# Patient Record
Sex: MALE | Race: WHITE | NOT HISPANIC OR LATINO | URBAN - METROPOLITAN AREA
[De-identification: names, ages, dates, MRNs, and addresses within clinical notes are randomized per-mention and may not be internally consistent; named-entity substitution may affect disease eponyms.]

---

## 2022-06-06 ENCOUNTER — EMERGENCY (EMERGENCY)
Facility: HOSPITAL | Age: 37
LOS: 1 days | Discharge: HOME | End: 2022-06-06
Attending: EMERGENCY MEDICINE | Admitting: EMERGENCY MEDICINE
Payer: OTHER MISCELLANEOUS

## 2022-06-06 VITALS
WEIGHT: 279.99 LBS | DIASTOLIC BLOOD PRESSURE: 91 MMHG | HEART RATE: 93 BPM | HEIGHT: 78 IN | TEMPERATURE: 98 F | RESPIRATION RATE: 17 BRPM | SYSTOLIC BLOOD PRESSURE: 147 MMHG
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
## 2022-06-06 DIAGNOSIS — Z88.0 ALLERGY STATUS TO PENICILLIN: ICD-10-CM

## 2022-06-06 DIAGNOSIS — S61.214A LACERATION WITHOUT FOREIGN BODY OF RIGHT RING FINGER WITHOUT DAMAGE TO NAIL, INITIAL ENCOUNTER: ICD-10-CM

## 2022-06-06 DIAGNOSIS — Z23 ENCOUNTER FOR IMMUNIZATION: ICD-10-CM
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
## 2022-06-06 DIAGNOSIS — S62.664A NONDISPLACED FRACTURE OF DISTAL PHALANX OF RIGHT RING FINGER, INITIAL ENCOUNTER FOR CLOSED FRACTURE: ICD-10-CM

## 2022-06-06 DIAGNOSIS — Z91.041 RADIOGRAPHIC DYE ALLERGY STATUS: ICD-10-CM
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
## 2022-06-06 PROCEDURE — 73130 X-RAY EXAM OF HAND: CPT | Mod: 26,RT

## 2022-06-06 PROCEDURE — 99283 EMERGENCY DEPT VISIT LOW MDM: CPT | Mod: 25

## 2022-06-06 PROCEDURE — 12002 RPR S/N/AX/GEN/TRNK2.6-7.5CM: CPT

## 2022-06-06 NOTE — CONSULT NOTE ADULT - SUBJECTIVE AND OBJECTIVE BOX
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  Able to maintain right ring finger DIP joint in extension.  X-ray show minimally displaced tuft fracture of distal phalanx of right ring finger, No foreign bodies.   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  Able to maintain right ring finger DIP joint in extension.  X-ray shows minimally displaced tuft fracture of distal phalanx of right ring finger, No foreign bodies.

## 2022-06-06 NOTE — CONSULT NOTE ADULT - ASSESSMENT
laceration of dorsum of right ring finger at DIP joint level without foreign bodies, and extensor tendon function intact.

## 2022-06-06 NOTE — CONSULT NOTE ADULT - PROBLEM SELECTOR RECOMMENDATION 9
suggest rinse with betadine and wound closure with running 5-0 nylon, finger splint with PIP and DIP joints at   full extension  Tetanus shot  Keflex for a week . Follow up in my office Thursday 10 am

## 2022-06-06 NOTE — ED ADULT TRIAGE NOTE - CHIEF COMPLAINT QUOTE
Pt walked in w/ right hand 4th digit laceration. Pt is a  and accidently cut his finger at work. Wound actively bleeding, pressure dressing applied. Pulses and sensation intact. Pmh of HTN.  Tetanus not up to date.

## 2022-06-07 ENCOUNTER — EMERGENCY (EMERGENCY)
Facility: HOSPITAL | Age: 37
LOS: 1 days | Discharge: HOME | End: 2022-06-07
Admitting: EMERGENCY MEDICINE
Payer: OTHER MISCELLANEOUS

## 2022-06-07 VITALS
OXYGEN SATURATION: 97 % | RESPIRATION RATE: 16 BRPM | SYSTOLIC BLOOD PRESSURE: 147 MMHG | HEART RATE: 91 BPM | DIASTOLIC BLOOD PRESSURE: 95 MMHG | TEMPERATURE: 99 F

## 2022-06-07 VITALS
HEIGHT: 78 IN | OXYGEN SATURATION: 98 % | DIASTOLIC BLOOD PRESSURE: 102 MMHG | RESPIRATION RATE: 16 BRPM | SYSTOLIC BLOOD PRESSURE: 144 MMHG | HEART RATE: 92 BPM | TEMPERATURE: 99 F | WEIGHT: 279.99 LBS

## 2022-06-07 DIAGNOSIS — Z88.0 ALLERGY STATUS TO PENICILLIN: ICD-10-CM

## 2022-06-07 DIAGNOSIS — S61.214D LACERATION WITHOUT FOREIGN BODY OF RIGHT RING FINGER WITHOUT DAMAGE TO NAIL, SUBSEQUENT ENCOUNTER: ICD-10-CM
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
## 2022-06-07 DIAGNOSIS — Z91.041 RADIOGRAPHIC DYE ALLERGY STATUS: ICD-10-CM

## 2022-06-07 PROCEDURE — 12001 RPR S/N/AX/GEN/TRNK 2.5CM/<: CPT

## 2022-06-07 PROCEDURE — 99283 EMERGENCY DEPT VISIT LOW MDM: CPT | Mod: 25

## 2022-06-07 RX ORDER — IBUPROFEN 200 MG
1 TABLET ORAL
Qty: 28 | Refills: 0
Start: 2022-06-07 | End: 2022-06-13

## 2022-06-07 RX ORDER — TETANUS TOXOID, REDUCED DIPHTHERIA TOXOID AND ACELLULAR PERTUSSIS VACCINE, ADSORBED 5; 2.5; 8; 8; 2.5 [IU]/.5ML; [IU]/.5ML; UG/.5ML; UG/.5ML; UG/.5ML
0.5 SUSPENSION INTRAMUSCULAR ONCE
Refills: 0 | Status: COMPLETED | OUTPATIENT
Start: 2022-06-07 | End: 2022-06-07

## 2022-06-07 RX ADMIN — Medication 100 MILLIGRAM(S): at 00:52

## 2022-06-07 RX ADMIN — TETANUS TOXOID, REDUCED DIPHTHERIA TOXOID AND ACELLULAR PERTUSSIS VACCINE, ADSORBED 0.5 MILLILITER(S): 5; 2.5; 8; 8; 2.5 SUSPENSION INTRAMUSCULAR at 02:27

## 2022-06-07 NOTE — ED PROVIDER NOTE - CLINICAL SUMMARY MEDICAL DECISION MAKING FREE TEXT BOX
patient with laceration injury to R 4th digit, with flap, and nail laceration, will require xrays, tetanus required, abx likely will consult hand. otherwise no distracting injuries, no ac, stable vs.

## 2022-06-07 NOTE — ED PROVIDER NOTE - PATIENT PORTAL LINK FT
You can access the FollowMyHealth Patient Portal offered by [facility] by registering at the following website: [email or web address]. By joining [employer]’s FollowMyHealth portal, you will also be able to view your health information using other applications (apps) compatible with our system.

## 2022-06-07 NOTE — ED PROVIDER NOTE - PHYSICAL EXAMINATION
Constitutional:  Well appearing, awake, alert, oriented to person, place, time/situation and in no apparent distress  Head:  NC/AT, symmetric  ENMT: Airway patent  Eyes:  Clear bilaterally, pupils equal, round   Cardiac:  Normal rate  Respiratory:  Normal respiratory rate and effort  GI:   Abd soft, non-distended  MSK:  Atraumatic, FROM  Neuro:  Alert and oriented  Skin:  Skin normal color for race, warm, dry.  R hand: 4th digit laceration over dorsal digit with sutures in place.  there is scant bleeding along radial border of laceration.  brisk cap refill and sensation is intact.  Sutured flap is slightly discolored.  No purulence or crepitus, no edema.  Psych:  Normal mood and affect, no apparent risk to self or others.

## 2022-06-07 NOTE — ED PROVIDER NOTE - CARE PLAN
Principal Discharge DX:	Laceration of finger, right, complicated  Secondary Diagnosis:	Closed fracture of tuft of distal phalanx of finger   1

## 2022-06-07 NOTE — ED PROVIDER NOTE - OBJECTIVE STATEMENT
36 y/o M returns to ED s/p laceration repair of 4th finger on R hand.  Pt states he has been bleeding from the wound since the laceration repair.  He called Dr. Victor during the day who urged to come to the ED for a wound check.

## 2022-06-07 NOTE — ED PROVIDER NOTE - PROGRESS NOTE DETAILS
patient advised follow up with dr chen on thursday. per dr chen recommendation, suture laceration, splint, and place on abx, follow up with his office on thursday. after reviewing films with patient, he notes several weeks ago he slammed same finger at work, and sustained bruising to tip, possible sub acute tuft fracture.

## 2022-06-07 NOTE — ED PROVIDER NOTE - CARE PROVIDER_API CALL
Michael Victor)  Plastic Surgery; Surgery of the Hand  [address] [place] [address], [address], [place] [address]  Phone: [phone]  Fax: [phone]  Follow Up Time:

## 2022-06-07 NOTE — ED PROVIDER NOTE - OBJECTIVE STATEMENT
36 yo M, pmhx of HTN, presents with R ring finger laceration, R hand dominant, sustained while carrying several cardboard boxes, and fell onto a metal grate with boxes, and abraded and contused hand against metal grate. notes no numbness or paresthesias. denies numbness, paresthesias. endorses some etoh, works as a . denies loc, denies head or neck injury. denies injury to rest of the RUE. unsure of tetanus at this time. denies focal weakness to RUE. ambulatory to ED.

## 2022-06-07 NOTE — ED PROVIDER NOTE - NSFOLLOWUPINSTRUCTIONS_ED_ALL_ED_FT
FOLLOW UP WITH DR. MORA, HAND SURGERY AS SCHEDULED TOMORROW.    KEEP WOUND CLEAN AND DRY.  CONTINUE TAKING ANTIBIOTICS AS PRESCRIBED.

## 2022-06-07 NOTE — ED PROCEDURE NOTE - CPROC ED POST PROC CARE GUIDE1
wound check in 24-48 hours or prior if showing signs of infection or bleeding./Verbal/written post procedure instructions were given to patient/caregiver./Instructed patient/caregiver to follow-up with primary care physician./Instructed patient/caregiver regarding signs and symptoms of infection./Keep the cast/splint/dressing clean and dry.

## 2022-06-07 NOTE — ED PROVIDER NOTE - SKIN AREA #1
U shaped flap laceration over dip on 4th ring finger, not circumferential with visible fat, and no visible tendon./dorsal

## 2022-06-07 NOTE — ED PROVIDER NOTE - UPPER EXTREMITY EXAM, RIGHT
from in extension and flexion 5/5 at 4th digit with distal median/radial/ulnar nn intact to motor and sensory.,/SWELLING/TENDERNESS

## 2022-06-07 NOTE — ED ADULT NURSE NOTE - OBJECTIVE STATEMENT
right hand 4th digit laceration. Pt is a  and accidently cut his finger at work. Wound actively bleeding, pressure dressing applied. Pulses and sensation intact. Tetanus not UTD

## 2022-06-07 NOTE — ED PROVIDER NOTE - PATIENT PORTAL LINK FT
You can access the FollowMyHealth Patient Portal offered by [facility] [place] by registering at the following website: [email or web address]. By joining [employer]’s FollowMyHealth portal, you will also be able to view your health information using other applications (apps) compatible with our system.

## 2022-06-07 NOTE — ED PROVIDER NOTE - CARE PROVIDER_API CALL
Michael Victor)  Plastic Surgery; Surgery of the Hand  [address] [place] [address], [address], [place] [address]  Phone: [phone]  Fax: [phone]  Follow Up Time:

## 2022-06-07 NOTE — ED ADULT NURSE NOTE - PRO INTERPRETER NEED 2
Refilled ONE + ONE REFILL.  Please call patient to have him schedule in-clinic appointment.   English

## 2022-06-07 NOTE — ED PROVIDER NOTE - CLINICAL SUMMARY MEDICAL DECISION MAKING FREE TEXT BOX
38 y/o M presents to ED for wound check of sutured laceration to R 4th digit.  One additional suture added to area where scant bleeding noted.  Wound cleansed with betadine and NS.  Clean dressing applied and finger splint reapplied.

## 2022-06-07 NOTE — ED PROCEDURE NOTE - CPROC ED POST PROC CARE GUIDE1
Verbal/written post procedure instructions were given to patient/caregiver./Instructed patient/caregiver to follow-up with primary care physician./Instructed patient/caregiver regarding signs and symptoms of infection./Elevate the injured extremity as instructed./Keep the cast/splint/dressing clean and dry.
Verbal/written post procedure instructions were given to patient/caregiver./Instructed patient/caregiver to follow-up with primary care physician./Instructed patient/caregiver regarding signs and symptoms of infection./Elevate the injured extremity as instructed./Keep the cast/splint/dressing clean and dry.
Verbal/written post procedure instructions were given to patient/caregiver./Instructed patient/caregiver to follow-up with primary care physician./Instructed patient/caregiver regarding signs and symptoms of infection./Keep the cast/splint/dressing clean and dry.

## 2022-09-01 NOTE — ED ADULT TRIAGE NOTE - GLASGOW COMA SCALE: EYE OPENING, MLM
(E4) spontaneous

## 2023-10-13 NOTE — ED PROVIDER NOTE - IV ALTEPLASE DOOR HIDDEN
VISUAL FIELD TEST 19-1DI-LQ-DONE/AD  OD-REL-FAIR-FIX-POOR-COOP-FAIR/AD  OS-REL-FAIR-FIX-POOR-COOP-FAIR/AD    PT HAS NO KNOW ALLERGIES TO LATEX OR ADHESIVES./AD  USED PIRATE PATCH TO MAKE TEST INTERESTING FOR PT./AD                               
show
